# Patient Record
Sex: MALE | URBAN - METROPOLITAN AREA
[De-identification: names, ages, dates, MRNs, and addresses within clinical notes are randomized per-mention and may not be internally consistent; named-entity substitution may affect disease eponyms.]

---

## 2023-12-23 ENCOUNTER — HOSPITAL ENCOUNTER (OUTPATIENT)
Dept: TELEMEDICINE | Facility: HOSPITAL | Age: 73
Discharge: HOME | End: 2023-12-23

## 2023-12-23 DIAGNOSIS — G93.40 ENCEPHALOPATHY: Primary | ICD-10-CM

## 2023-12-23 PROCEDURE — G0508 CRIT CARE TELEHEA CONSULT 60: HCPCS | Mod: GT,,, | Performed by: PSYCHIATRY & NEUROLOGY

## 2023-12-23 PROCEDURE — G0508 PR CRITICAL CARE TELEHLTH INITIAL CONSULT 60MIN: ICD-10-PCS | Mod: GT,,, | Performed by: PSYCHIATRY & NEUROLOGY

## 2023-12-23 NOTE — SUBJECTIVE & OBJECTIVE
HPI:  73 y.o. male with decreased verbal output, LUE and RLE weakness.  Pt admitted this AM and found in renal failure and underwent dialysis.  Per nurse, around 4p he was noted to not be speaking with LUE and RLE weakness.  It is unclear if he was encephalopathic prior to dialysis. No abnl motor activity noted.  Glu 115.  /93.  Home meds are unknown so it is uncertain if he takes an anticoagulant.     On exam, he is sleepy but arousable, decreased attention/concentration, generates single words, follows commands, non-focal extremities.    Images personally reviewed and interpreted:  Study: CTA Head & Neck  Study Interpretation: nl      Assessment and plan:  Encephalopathy:  recheck labs, U/A, CXR.  MRI brain if no identifiable etiology of encephalopathy.  Consider EEG if MRI negative.    Lytics recommendation: Thrombolytic therapy not recommended due to Suspected stroke mimic  and unknown home meds  Thrombectomy recommendation: No; No large vessel occlusion identified on imaging   [place] [facility] [place]

## 2023-12-24 NOTE — TELEMEDICINE CONSULT
[facility]  Vascular Neurology  Comprehensive Stroke Center  TeleVascular Neurology Acute Consultation Note        Consult Information  Consults    Consulting Provider: JUNE RITCHIE JR   Current Providers  No providers found    Patient Location: [facility] ED [facility] Emergency Department    Spoke hospital nurse at bedside with patient assisting consultant.  Patient information was obtained from primary team.       Stroke Documentation  Acute Stroke Times   Last Known Normal Date: 12/23/23  Last Known Normal Time: 1600  Symptom Onset Date: (P) 12/23/23  Symptom Onset Time: (P) 1610  Stroke Team Called Date: (P) 12/23/23  Stroke Team Called Time: (P) 1644  Stroke Team Arrival Date: 12/23/23  Stroke Team Arrival Time: 1750  CT Interpretation Time: 1810  Thrombolytic Therapy Recommended: No  CTA Interpretation Time: 1810  Thrombectomy Recommended: No    NIH Scale:  Interval: baseline  1a. Level of Consciousness: 1-->Not alert, but arousable by minor stimulation to obey, answer, or respond  1b. LOC Questions: 2-->Answers neither question correctly  1c. LOC Commands: 0-->Performs both tasks correctly  2. Best Gaze: 0-->Normal  3. Visual: 0-->No visual loss  4. Facial Palsy: 0-->Normal symmetrical movements  5a. Motor Arm, Left: 0-->No drift, limb holds 90 (or 45) degrees for full 10 secs  5b. Motor Arm, Right: 0-->No drift, limb holds 90 (or 45) degrees for full 10 secs  6a. Motor Leg, Left: 1-->Drift, leg falls by the end of the 5-sec period but does not hit bed  6b. Motor Leg, Right: 1-->Drift, leg falls by the end of the 5-sec period but does not hit bed  7. Limb Ataxia: 0-->Absent  8. Sensory: 0-->Normal, no sensory loss  9. Best Language: 2-->Severe aphasia, all communication is through fragmentary expression, great need for inference, questioning, and guessing by the listener. Range of information that can be exchanged is limited, listener carries  burden of. . . (see row details)  10. Dysarthria: 0-->Normal  11. Extinction and Inattention (formerly Neglect): 0-->No abnormality  Total (NIH Stroke Scale): 7      Modified [place]:    Pari Coma Scale:     ABCD2 Score:    [id number] Score:    HAS -BLED Score:    ICH Score:    Hunt & Jane Classification:      There were no vitals taken for this visit.    Van Negative    Diagnoses  Problem Noted   Encephalopathy 12/23/2023       Medical Decision Making  HPI:  73 y.o. male with decreased verbal output, LUE and RLE weakness.  Pt admitted this AM and found in renal failure and underwent dialysis.  Per nurse, around 4p he was noted to not be speaking with LUE and RLE weakness.  It is unclear if he was encephalopathic prior to dialysis. No abnl motor activity noted.  Glu 115.  /93.  Home meds are unknown so it is uncertain if he takes an anticoagulant.     On exam, he is sleepy but arousable, decreased attention/concentration, generates single words, follows commands, non-focal extremities.    Images personally reviewed and interpreted:  Study: CTA Head & Neck  Study Interpretation: nl      Assessment and plan:  Encephalopathy:  recheck labs, U/A, CXR.  MRI brain if no identifiable etiology of encephalopathy.  Consider EEG if MRI negative.    Lytics recommendation: Thrombolytic therapy not recommended due to Suspected stroke mimic  and unknown home meds  Thrombectomy recommendation: No; No large vessel occlusion identified on imaging   Placement recommendation: remains as inpatient at [facility]  Physical Exam  No past medical history on file.  No past surgical history on file.  No family history on file.        Ian Sol MD      Emergent/Acute neurological [place] [facility] [place] due to critical concerns for possible cerebrovascular event that could result in permanent loss of neurologic/bodily function, severe disability or death of this patient.  Immediate/timely  evaluation by a highly prepared expert is paramount for optimal outcomes  High risk for neurological deterioration if not properly diagnosed  High risk for neurological deterioration if not treated promplty/as soon as possible  Complex diagnostic evaluation may be required (advanced imaging)  High risk treatment options (thrombolytics and/or thrombectomy)    Patient care was coordinated with spoke provider, including but not limted to    Discussing likely diagnosis/etiology of symptoms  Making recommendations for further diagnostic studies  Making recommendations for intravenous thrombolytics or other advanced therapies  Making recommendations for disposition (admission/transfer for higher level of care)